# Patient Record
Sex: FEMALE | Race: WHITE | NOT HISPANIC OR LATINO
[De-identification: names, ages, dates, MRNs, and addresses within clinical notes are randomized per-mention and may not be internally consistent; named-entity substitution may affect disease eponyms.]

---

## 2022-04-27 PROBLEM — Z00.00 ENCOUNTER FOR PREVENTIVE HEALTH EXAMINATION: Status: ACTIVE | Noted: 2022-04-27

## 2022-05-04 ENCOUNTER — RESULT REVIEW (OUTPATIENT)
Age: 72
End: 2022-05-04

## 2022-05-04 ENCOUNTER — OUTPATIENT (OUTPATIENT)
Dept: OUTPATIENT SERVICES | Facility: HOSPITAL | Age: 72
LOS: 1 days | End: 2022-05-04
Payer: MEDICARE

## 2022-05-04 ENCOUNTER — APPOINTMENT (OUTPATIENT)
Dept: VASCULAR SURGERY | Facility: CLINIC | Age: 72
End: 2022-05-04
Payer: MEDICARE

## 2022-05-04 VITALS
DIASTOLIC BLOOD PRESSURE: 80 MMHG | SYSTOLIC BLOOD PRESSURE: 132 MMHG | BODY MASS INDEX: 29.57 KG/M2 | HEIGHT: 66 IN | HEART RATE: 59 BPM | WEIGHT: 184 LBS

## 2022-05-04 DIAGNOSIS — M79.89 OTHER SPECIFIED SOFT TISSUE DISORDERS: ICD-10-CM

## 2022-05-04 DIAGNOSIS — R20.2 PARESTHESIA OF SKIN: ICD-10-CM

## 2022-05-04 DIAGNOSIS — I78.1 NEVUS, NON-NEOPLASTIC: ICD-10-CM

## 2022-05-04 DIAGNOSIS — M79.601 PARESTHESIA OF SKIN: ICD-10-CM

## 2022-05-04 DIAGNOSIS — M79.602 PARESTHESIA OF SKIN: ICD-10-CM

## 2022-05-04 DIAGNOSIS — Z85.118 PERSONAL HISTORY OF OTHER MALIGNANT NEOPLASM OF BRONCHUS AND LUNG: ICD-10-CM

## 2022-05-04 DIAGNOSIS — N28.9 DISORDER OF KIDNEY AND URETER, UNSPECIFIED: ICD-10-CM

## 2022-05-04 DIAGNOSIS — R06.9 UNSPECIFIED ABNORMALITIES OF BREATHING: ICD-10-CM

## 2022-05-04 DIAGNOSIS — M54.10 RADICULOPATHY, SITE UNSPECIFIED: ICD-10-CM

## 2022-05-04 DIAGNOSIS — I10 ESSENTIAL (PRIMARY) HYPERTENSION: ICD-10-CM

## 2022-05-04 DIAGNOSIS — E78.5 HYPERLIPIDEMIA, UNSPECIFIED: ICD-10-CM

## 2022-05-04 DIAGNOSIS — I83.90 ASYMPTOMATIC VARICOSE VEINS OF UNSPECIFIED LOWER EXTREMITY: ICD-10-CM

## 2022-05-04 DIAGNOSIS — Z87.891 PERSONAL HISTORY OF NICOTINE DEPENDENCE: ICD-10-CM

## 2022-05-04 PROCEDURE — 99203 OFFICE O/P NEW LOW 30 MIN: CPT

## 2022-05-04 PROCEDURE — 93970 EXTREMITY STUDY: CPT

## 2022-05-04 PROCEDURE — 72052 X-RAY EXAM NECK SPINE 6/>VWS: CPT

## 2022-05-04 PROCEDURE — 72110 X-RAY EXAM L-2 SPINE 4/>VWS: CPT

## 2022-05-04 PROCEDURE — 72052 X-RAY EXAM NECK SPINE 6/>VWS: CPT | Mod: 26

## 2022-05-04 PROCEDURE — 72110 X-RAY EXAM L-2 SPINE 4/>VWS: CPT | Mod: 26

## 2022-05-04 RX ORDER — ATENOLOL 25 MG/1
25 TABLET ORAL
Refills: 0 | Status: ACTIVE | COMMUNITY

## 2022-05-04 RX ORDER — MULTIVIT-MIN/FOLIC/VIT K/LYCOP 400-300MCG
1000 TABLET ORAL
Refills: 0 | Status: ACTIVE | COMMUNITY

## 2022-05-04 RX ORDER — EZETIMIBE 10 MG/1
10 TABLET ORAL
Refills: 0 | Status: ACTIVE | COMMUNITY

## 2022-05-04 RX ORDER — LOSARTAN POTASSIUM 50 MG/1
50 TABLET, FILM COATED ORAL
Refills: 0 | Status: ACTIVE | COMMUNITY

## 2022-05-04 RX ORDER — UBIDECARENONE/VIT E ACET 100MG-5
1000 CAPSULE ORAL
Refills: 0 | Status: ACTIVE | COMMUNITY

## 2022-05-04 RX ORDER — LEVOTHYROXINE SODIUM 0.11 MG/1
112 TABLET ORAL
Refills: 0 | Status: ACTIVE | COMMUNITY

## 2022-05-04 RX ORDER — CALCITRIOL 0.5 UG/1
0.5 CAPSULE, LIQUID FILLED ORAL
Refills: 0 | Status: ACTIVE | COMMUNITY

## 2022-05-12 NOTE — PHYSICAL EXAM
[Respiratory Effort] : normal respiratory effort [Normal Rate and Rhythm] : normal rate and rhythm [No Rash or Lesion] : No rash or lesion [Alert] : alert [Oriented to Person] : oriented to person [Oriented to Place] : oriented to place [Oriented to Time] : oriented to time [Calm] : calm [2+] : left 2+ [Varicose Veins Of Lower Extremities] : bilaterally [Ankle Swelling On The Right] : mild [Ankle Swelling (On Exam)] : not present [] : not present [de-identified] : WN/WD [FreeTextEntry1] : Bilateral scattered spider veins down the thigh to ankle. Mild bulging veins on both anterior thighs to knees. Skin dry and intact. No edema or erythema.  [de-identified] : FROM

## 2022-05-12 NOTE — PROCEDURE
[FreeTextEntry1] : LE Venous US ordered today to r/o DVT, shows: negative DVT/SVT. R GSV not visualized.

## 2022-05-12 NOTE — ADDENDUM
[FreeTextEntry1] : I, Dr. Daljit Vera, personally performed the evaluation and management (E/M) services for this new patient.  That E/M includes conducting the initial examination, assessing all conditions, and establishing the plan of care.  Today, my ACP, Naty Mustafa NP, was here to observe my evaluation and management services for this patient to be followed going forward. \par \par The documentation for this encounter was entered by Ernesto Garcia acting as a scribe for Dr.Gary Vera.\par

## 2022-05-12 NOTE — HISTORY OF PRESENT ILLNESS
[FreeTextEntry1] : 70 y/o F referred by Dr Nazario for evaluation. She is a former smoker and has a PMHx of HLD, HTN, lung CA s/p resection (2018), bronchoscopy/lobectomy and lymph node dissection (2021), thyroidectomy, hysterectomy, and bilt LEs asymptomatic varicose veins w/ hx of R GSV closure in 2020. She presents today for leg pain (R>L) that radiates from her hips to the ankles mostly at night. She reports she injured her legs when a bus door closed on them. After her injury, her R leg became swollen and red. She was on antibiotics. She explains the redness and swelling went away but the pain is still present in the right leg. Denies any claudication numbness, skin breakdown, leg heaviness, pain over varices. She is on daily ASA. 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  \par \par PMHx \par - ulcers in the stomach \par

## 2022-05-12 NOTE — CONSULT LETTER
[Dear  ___] : Dear  [unfilled], [FreeTextEntry1] : Thank you for referring MS Nikki Ulloa for evaluation. She had an episode of trauma whereby the doors of a bus closed on her legs as she was stepping off, several weeks ago. The right leg was red and swollen which has now resolved.  She took antibiotics for 1 week.  She reports consistent pain in the right leg.  She denies any previous thrombotic episodes.  She has hip arthritis.  \par \par On exam, legs are warm, well perfused.  The knees appear to be arthritic.  Scattered spider and mildly bulging varicose veins on both legs. No erythema or edema. Skin mildly dry.\par \par Venous duplex of bilateral legs shows no evidence of deep or superficial vein thrombosis. Right greater saphenous vein not visualized consistent with history of closure. \par \par I reassured Ms Ulloa she has good arterial circulation and no evidence of a venous thrombosis. Her leg pain is likely secondary to her lumbar degenerative disc disease, knee and hip arthritis..  I ordered lumbosacral spine x-rays.  I advised that she try to remain as active as possible.  She also needs to moisturize the skin of her legs.  She may see me as needed.\par \par Addendum: X-rays of the spine show multilevel degenerative disc disease.  I enclosed copies of the reports for you.\par \par My complete EMR office note is below for your records. \par \par Warm regards. [FreeTextEntry2] : Colleen Nazario DPM\par [address] [facility]\[place] [address] \par \par Yoon Ulrich MD\par [address] [place] [address]\par [place] [phone] [FreeTextEntry3] : Sincerely, \par \par Daljit Vera M.D. \par , Surgical Services [facility]\par , Department of Surgery [facility]\par Professor of Surgery, [employer] at [facility]

## 2022-05-12 NOTE — ASSESSMENT
[Arterial/Venous Disease] : arterial/venous disease [FreeTextEntry1] : 70 y/o F w/  BLEs radiculopathy pain R>L, and asymptomatic scattered varicose and spider veins w/ hx of R GSV ablation. On exam, bilateral scattered spider veins down the thigh to ankle. Mild bulging veins on both anterior thighs to knees. Skin dry and intact. No edema or erythema. Venous US shows negative DVT/SVT. R GSV not visualized. We had a long discussion and I explained to her the symptoms are not vascular related and most likely due to arthritis in the spine, hips and knees. Will order cervical and lumbosacral xrays. F/u as prn. \par \par \par Addendum: \par Reviewed xray results. Evidence of degenerative lumbar dis disorder.

## 2022-07-11 ENCOUNTER — APPOINTMENT (OUTPATIENT)
Dept: UROLOGY | Facility: CLINIC | Age: 72
End: 2022-07-11

## 2022-07-11 VITALS — TEMPERATURE: 95.6 F | SYSTOLIC BLOOD PRESSURE: 135 MMHG | HEART RATE: 80 BPM | DIASTOLIC BLOOD PRESSURE: 85 MMHG

## 2022-07-11 DIAGNOSIS — R33.9 RETENTION OF URINE, UNSPECIFIED: ICD-10-CM

## 2022-07-11 DIAGNOSIS — N39.0 URINARY TRACT INFECTION, SITE NOT SPECIFIED: ICD-10-CM

## 2022-07-11 PROCEDURE — 99204 OFFICE O/P NEW MOD 45 MIN: CPT

## 2022-07-11 PROCEDURE — 51798 US URINE CAPACITY MEASURE: CPT

## 2022-07-11 NOTE — PHYSICAL EXAM

## 2022-07-11 NOTE — REVIEW OF SYSTEMS
[Negative] : Heme/Lymph [Wake up at night to urinate  How many times?  ___] : wakes up to urinate [unfilled] times during the night [Strong urge to urinate] : strong urge to urinate [Bladder fullness after urinating] : bladder fullness after urinating [Leakage of urine with urgency] : leakage of urine with urgency [Leakage of urine with straining, coughing, laughing] : leakage of urine with straining, coughing, laughing [Unaware of when urine is leaking] : unaware of when urine is leaking [Incontinence] : incontinence

## 2022-07-11 NOTE — ASSESSMENT
[FreeTextEntry1] : The patient has severe and bothersome lower urinary tract symptoms and incomplete bladder emptying with poor response to pharmacotherapy, botulinum toxin injections and bladder surgery.  Patient will be referred to Dr. Linton  for complete evaluation of her lower urinary tract function and anatomy and possible treatment.

## 2022-07-11 NOTE — HISTORY OF PRESENT ILLNESS
[FreeTextEntry1] : This is a 71-year-old  female who comes to see us after many years.  The patient has a long history of overactive bladder and failed pharmacological therapy.  Subsequently, the patient had 3 botulinum toxin injections which were not successful in alleviating his symptoms.  Last year the patient underwent a surgical procedure to "lift her bladder", again without improvement in her significant urinary urgency, frequency and urge incontinence.  The patient was offered sacral modulation and presents for second opinion.  She denies dysuria, hematuria, suprapubic or flank pain or fevers.  She states that she is being followed by nephrologist for "kidney problems" and that she has had recurrent urinary tract infections.

## 2022-07-11 NOTE — LETTER BODY
[Dear  ___] : Dear  [unfilled], [Consult Letter:] : I had the pleasure of evaluating your patient, [unfilled]. [Please see my note below.] : Please see my note below. [Consult Closing:] : Thank you very much for allowing me to participate in the care of this patient.  If you have any questions, please do not hesitate to contact me. [Sincerely,] : Sincerely, [FreeTextEntry3] : Darin Duarte MD

## 2022-07-13 LAB
APPEARANCE: CLEAR
BACTERIA: ABNORMAL
BILIRUBIN URINE: NEGATIVE
BLOOD URINE: NEGATIVE
COLOR: COLORLESS
GLUCOSE QUALITATIVE U: NEGATIVE
HYALINE CASTS: 0 /LPF
KETONES URINE: NEGATIVE
LEUKOCYTE ESTERASE URINE: ABNORMAL
MICROSCOPIC-UA: NORMAL
NITRITE URINE: POSITIVE
PH URINE: 7
PROTEIN URINE: NEGATIVE
RED BLOOD CELLS URINE: 1 /HPF
SPECIFIC GRAVITY URINE: 1.01
SQUAMOUS EPITHELIAL CELLS: 1 /HPF
URINE CYTOLOGY: NORMAL
UROBILINOGEN URINE: NORMAL
WHITE BLOOD CELLS URINE: 2 /HPF

## 2022-07-18 ENCOUNTER — NON-APPOINTMENT (OUTPATIENT)
Age: 72
End: 2022-07-18

## 2022-07-18 LAB — BACTERIA UR CULT: ABNORMAL

## 2022-07-18 RX ORDER — NITROFURANTOIN (MONOHYDRATE/MACROCRYSTALS) 25; 75 MG/1; MG/1
100 CAPSULE ORAL
Qty: 10 | Refills: 0 | Status: ACTIVE | COMMUNITY
Start: 2022-07-18 | End: 1900-01-01

## 2022-07-19 ENCOUNTER — APPOINTMENT (OUTPATIENT)
Dept: UROLOGY | Facility: CLINIC | Age: 72
End: 2022-07-19

## 2022-07-19 VITALS
OXYGEN SATURATION: 97 % | HEART RATE: 72 BPM | SYSTOLIC BLOOD PRESSURE: 112 MMHG | DIASTOLIC BLOOD PRESSURE: 75 MMHG | TEMPERATURE: 97.6 F

## 2022-07-19 DIAGNOSIS — N39.41 URGE INCONTINENCE: ICD-10-CM

## 2022-07-19 DIAGNOSIS — N32.81 OVERACTIVE BLADDER: ICD-10-CM

## 2022-07-19 PROCEDURE — 99215 OFFICE O/P EST HI 40 MIN: CPT

## 2022-07-25 NOTE — ASSESSMENT
[FreeTextEntry1] : \par \par Impression/plan: 71 years old female with severe OAB-wet which failed medical treatment and botox injections. \par   \par 1. Discussed SNS is the best option new with higher success then tibal nerve stimulation. She will think about options. She will wait until her neurology appointment before decision. All questions answered.

## 2022-07-25 NOTE — HISTORY OF PRESENT ILLNESS
[FreeTextEntry1] : 71 years old female patient with a known history of severe OAB-wet. She has tried medical treatment which failed and tried Botox x3 which also failed. she is referred today for a second opinion for sacral nerve modulation. The patient reports that she is having neurological pain and neuropathy and is scheduled for and EMG early August.   \par \par [place]  used.